# Patient Record
Sex: MALE
[De-identification: names, ages, dates, MRNs, and addresses within clinical notes are randomized per-mention and may not be internally consistent; named-entity substitution may affect disease eponyms.]

---

## 2018-05-27 ENCOUNTER — CHARTING TRANS (OUTPATIENT)
Dept: OTHER | Age: 23
End: 2018-05-27

## 2018-05-27 ASSESSMENT — PAIN SCALES - GENERAL: PAINLEVEL_OUTOF10: 0

## 2018-11-01 VITALS — HEIGHT: 73 IN | WEIGHT: 160.17 LBS | BODY MASS INDEX: 21.23 KG/M2 | HEART RATE: 84 BPM | RESPIRATION RATE: 16 BRPM

## 2021-02-16 PROBLEM — F41.9 ANXIETY: Status: ACTIVE | Noted: 2021-02-16

## 2021-02-16 PROBLEM — N52.9 ERECTILE DYSFUNCTION, UNSPECIFIED ERECTILE DYSFUNCTION TYPE: Status: ACTIVE | Noted: 2021-02-16

## 2021-02-16 PROBLEM — E56.9 VITAMIN DEFICIENCY: Status: ACTIVE | Noted: 2021-02-16

## 2021-12-06 PROBLEM — R61 HYPERHIDROSIS: Status: ACTIVE | Noted: 2021-12-06

## 2021-12-07 PROBLEM — E55.9 VITAMIN D DEFICIENCY: Status: ACTIVE | Noted: 2021-12-07

## 2023-10-13 ENCOUNTER — HOSPITAL ENCOUNTER (EMERGENCY)
Facility: HOSPITAL | Age: 28
Discharge: HOME | End: 2023-10-14
Attending: EMERGENCY MEDICINE

## 2023-10-13 ENCOUNTER — APPOINTMENT (OUTPATIENT)
Dept: GENERAL RADIOLOGY | Facility: HOSPITAL | Age: 28
End: 2023-10-13
Attending: EMERGENCY MEDICINE
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
## 2023-10-13 DIAGNOSIS — S61.412A LACERATION OF LEFT HAND WITHOUT FOREIGN BODY, INITIAL ENCOUNTER: ICD-10-CM

## 2023-10-13 DIAGNOSIS — S52.202A CLOSED FRACTURE OF SHAFT OF LEFT ULNA, UNSPECIFIED FRACTURE MORPHOLOGY, INITIAL ENCOUNTER: ICD-10-CM

## 2023-10-13 PROCEDURE — 29105 APPLICATION LONG ARM SPLINT: CPT

## 2023-10-13 PROCEDURE — 73090 X-RAY EXAM OF FOREARM: CPT | Performed by: EMERGENCY MEDICINE

## 2023-10-13 PROCEDURE — 12001 RPR S/N/AX/GEN/TRNK 2.5CM/<: CPT

## 2023-10-13 PROCEDURE — 99284 EMERGENCY DEPT VISIT MOD MDM: CPT

## 2023-10-13 PROCEDURE — 90471 IMMUNIZATION ADMIN: CPT

## 2023-10-13 RX ORDER — HYDROCODONE BITARTRATE AND ACETAMINOPHEN 5; 325 MG/1; MG/1
1 TABLET ORAL ONCE
Status: COMPLETED | OUTPATIENT
Start: 2023-10-13 | End: 2023-10-13

## 2023-10-14 VITALS
OXYGEN SATURATION: 97 % | RESPIRATION RATE: 16 BRPM | DIASTOLIC BLOOD PRESSURE: 74 MMHG | WEIGHT: 168 LBS | TEMPERATURE: 97 F | SYSTOLIC BLOOD PRESSURE: 112 MMHG | HEIGHT: 73 IN | HEART RATE: 84 BPM | BODY MASS INDEX: 22.26 KG/M2

## 2023-10-14 RX ORDER — LIDOCAINE HYDROCHLORIDE 10 MG/ML
1 INJECTION, SOLUTION INFILTRATION; PERINEURAL ONCE
Status: COMPLETED | OUTPATIENT
Start: 2023-10-14 | End: 2023-10-14

## 2023-10-14 RX ORDER — HYDROCODONE BITARTRATE AND ACETAMINOPHEN 5; 325 MG/1; MG/1
1-2 TABLET ORAL EVERY 4 HOURS PRN
Qty: 12 TABLET | Refills: 0 | Status: SHIPPED | OUTPATIENT
Start: 2023-10-14 | End: 2023-11-01

## 2023-10-14 RX ORDER — LIDOCAINE HYDROCHLORIDE 10 MG/ML
INJECTION, SOLUTION INFILTRATION; PERINEURAL
Status: COMPLETED
Start: 2023-10-14 | End: 2023-10-14

## 2023-10-18 ENCOUNTER — TELEPHONE (OUTPATIENT)
Dept: ORTHOPEDICS CLINIC | Facility: CLINIC | Age: 28
End: 2023-10-18

## 2023-10-18 NOTE — TELEPHONE ENCOUNTER
Imaging was completed for this patient for a LT FOREARM FX, but it needs to be reviewed to see if additional imaging is needed. If so, please enter the appropriate RX and let the patient know to come in before their appointment to complete the additional imaging. Thank you!     Future Appointments   Date Time Provider Carole Lewis   10/19/2023  8:20 AM Mariely Duran MD [facility] [id number]

## 2023-10-19 ENCOUNTER — ORDER TRANSCRIPTION (OUTPATIENT)
Dept: ORTHOPEDICS CLINIC | Facility: CLINIC | Age: 28
End: 2023-10-19

## 2023-10-19 ENCOUNTER — OFFICE VISIT (OUTPATIENT)
Dept: ORTHOPEDICS CLINIC | Facility: CLINIC | Age: 28
End: 2023-10-19

## 2023-10-19 VITALS — HEIGHT: 73 IN | BODY MASS INDEX: 22.26 KG/M2 | WEIGHT: 168 LBS

## 2023-10-19 DIAGNOSIS — S52.232A DISPLACED OBLIQUE FRACTURE OF SHAFT OF LEFT ULNA, INITIAL ENCOUNTER FOR CLOSED FRACTURE: Primary | ICD-10-CM

## 2023-10-23 ENCOUNTER — OFFICE VISIT (OUTPATIENT)
Dept: ORTHOPEDICS CLINIC | Facility: CLINIC | Age: 28
End: 2023-10-23

## 2023-10-23 ENCOUNTER — TELEPHONE (OUTPATIENT)
Dept: ORTHOPEDICS CLINIC | Facility: CLINIC | Age: 28
End: 2023-10-23

## 2023-10-23 ENCOUNTER — HOSPITAL ENCOUNTER (OUTPATIENT)
Dept: GENERAL RADIOLOGY | Age: 28
Discharge: HOME | End: 2023-10-23
Attending: ORTHOPAEDIC SURGERY

## 2023-10-23 VITALS — BODY MASS INDEX: 22.53 KG/M2 | WEIGHT: 170 LBS | HEIGHT: 73 IN

## 2023-10-23 DIAGNOSIS — S52.232A DISPLACED OBLIQUE FRACTURE OF SHAFT OF LEFT ULNA, INITIAL ENCOUNTER FOR CLOSED FRACTURE: ICD-10-CM

## 2023-10-23 DIAGNOSIS — S52.232A DISPLACED OBLIQUE FRACTURE OF SHAFT OF LEFT ULNA, INITIAL ENCOUNTER FOR CLOSED FRACTURE: Primary | ICD-10-CM

## 2023-10-23 PROCEDURE — 73090 X-RAY EXAM OF FOREARM: CPT | Performed by: ORTHOPAEDIC SURGERY

## 2023-10-23 NOTE — TELEPHONE ENCOUNTER
Future Appointments   Date Time Provider Carole Shorei   10/25/2023  3:00 PM Buzz Arias MD [facility]     Patient called and would like to be rescheduled in the afternoon for today. He will call me back to confirm if he can make it by 1:30 today. Thanks.     Patient may be reached at [phone]

## 2023-10-23 NOTE — TELEPHONE ENCOUNTER
Future Appointments   Date Time Provider Carole Debbie   10/23/2023  1:30 PM Ezekiel Coon MD [facility] [id number]       Patient called back and states that he will be able to come in today at 1:30 was able to change the time of his ride.

## 2023-10-24 ENCOUNTER — TELEPHONE (OUTPATIENT)
Dept: ORTHOPEDICS CLINIC | Facility: CLINIC | Age: 28
End: 2023-10-24

## 2023-10-24 DIAGNOSIS — S52.232A DISPLACED OBLIQUE FRACTURE OF SHAFT OF LEFT ULNA, INITIAL ENCOUNTER FOR CLOSED FRACTURE: Primary | ICD-10-CM

## 2023-10-24 RX ORDER — HYDROCODONE BITARTRATE AND ACETAMINOPHEN 5; 325 MG/1; MG/1
1 TABLET ORAL EVERY 6 HOURS PRN
Qty: 15 TABLET | Refills: 0 | Status: SHIPPED | OUTPATIENT
Start: 2023-10-24

## 2023-10-24 NOTE — TELEPHONE ENCOUNTER
Patient called stating he was supposed to get an RX sent to his pharmacy yesterday. No RX on file. Pt thinks it was hydrocodone, that they discussed at the appt. Please advise.     Pharmacy:   [facility], [address] [place] [address] Drive AT [facility] [address] [facility] , [phone], [phone]

## 2023-11-01 ENCOUNTER — OFFICE VISIT (OUTPATIENT)
Dept: ORTHOPEDICS CLINIC | Facility: CLINIC | Age: 28
End: 2023-11-01

## 2023-11-01 ENCOUNTER — TELEPHONE (OUTPATIENT)
Dept: ORTHOPEDICS CLINIC | Facility: CLINIC | Age: 28
End: 2023-11-01

## 2023-11-01 ENCOUNTER — HOSPITAL ENCOUNTER (OUTPATIENT)
Dept: GENERAL RADIOLOGY | Age: 28
Discharge: HOME | End: 2023-11-01
Attending: ORTHOPAEDIC SURGERY

## 2023-11-01 VITALS — HEIGHT: 73 IN | WEIGHT: 170 LBS | BODY MASS INDEX: 22.53 KG/M2

## 2023-11-01 DIAGNOSIS — S52.232A DISPLACED OBLIQUE FRACTURE OF SHAFT OF LEFT ULNA, INITIAL ENCOUNTER FOR CLOSED FRACTURE: Primary | ICD-10-CM

## 2023-11-01 DIAGNOSIS — S52.232A DISPLACED OBLIQUE FRACTURE OF SHAFT OF LEFT ULNA, INITIAL ENCOUNTER FOR CLOSED FRACTURE: ICD-10-CM

## 2023-11-01 PROCEDURE — 73090 X-RAY EXAM OF FOREARM: CPT | Performed by: ORTHOPAEDIC SURGERY

## 2023-11-01 RX ORDER — HYDROCODONE BITARTRATE AND ACETAMINOPHEN 5; 325 MG/1; MG/1
1 TABLET ORAL EVERY 6 HOURS PRN
Qty: 30 TABLET | Refills: 0 | Status: SHIPPED | OUTPATIENT
Start: 2023-11-01

## 2023-11-01 NOTE — TELEPHONE ENCOUNTER
Date of Surgery: 11/3/23       Post Op Appt: 23 @ 1020    Case ID: [id number]    Notes:         [address]   Name: Jluis Rao  MRN: [id number]   : 1995     Surgical Date:    11/3/23   Surgical Consent:    Open reduction internal fixation of left distal ulna fracture   Diagnosis:     (S52.232A) Displaced oblique fracture of shaft of left ulna, initial encounter for closed fracture  (primary encounter diagnosis)    Procedure Codes:    ORIF ulna shaft fracture (CPT 50355)   Disposition:    Outpatient   Operative Time:    1 hr   Antibiotics:    Ancef 2g   Anesthesia Type:    Regional   Clearance:     NONE   Equipment:    DRFx (SD): [employer] Forearm Plating System and Ulnar shortening osteotomy plating system, Small Power, Full C-arm, Suture 0-Vicryl UR6 needle, 3-0 monocryl, 4-0 monocryl, Exofin, Steri-strips, 5x30 plaster sheets, 2 inch ace wrap, sling   Assistant:    Stephanie Wong Assistant: Yes, Surgical Assist    Follow Up:    7-10 days post op with Grant Carl   Pain Medication:    Resent   Therapy:    None

## 2023-11-01 NOTE — PROGRESS NOTES
SURGICAL BOOKING SHEET   Name: Dianne Schilder  MRN: [id number]   : 1995    Surgical Date:   11/3/23   Surgical Consent:   Open reduction internal fixation of left distal ulna fracture   Diagnosis:    (S52.232A) Displaced oblique fracture of shaft of left ulna, initial encounter for closed fracture  (primary encounter diagnosis)    Procedure Codes:   ORIF ulna shaft fracture (CPT 40594)   Disposition:   Outpatient   Operative Time:   1 hr   Antibiotics:   Ancef 2g   Anesthesia Type:   Regional   Clearance:    NONE   Equipment:   DRFx (SD): [employer] Forearm Plating System and Ulnar shortening osteotomy plating system, Small Power, Full C-arm, Suture 0-Vicryl UR6 needle, 3-0 monocryl, 4-0 monocryl, Exofin, Steri-strips, 5x30 plaster sheets, 2 inch ace wrap, sling   Assistant:   Kathy Hampton Assistant: Yes, Surgical Assist    Follow Up:   7-10 days post op with Anshul Khan   Pain Medication:   Resent   Therapy:   None

## 2023-11-01 NOTE — TELEPHONE ENCOUNTER
Called and spoke with Halley Cross in regards to his upcoming surgery. I did go over the surgical protocol and post op appt date and time with him. He states understanding with no questions or concerns.

## 2023-11-02 ENCOUNTER — ANESTHESIA EVENT (OUTPATIENT)
Dept: SURGERY | Facility: HOSPITAL | Age: 28
End: 2023-11-02

## 2023-11-02 NOTE — TELEPHONE ENCOUNTER
PRE ADMISSION CALLED AND STATED THEY HAVEN'T BEEN ABLE TO GET IN TOUCH WITH PATIENT. THEY WILL NEED A PRE PAYMENT OF [phone] FOR UPCOMING SURGERY. I CALLED PATIENT AND LEFT A VM AND ASKED TO CALL PRE ADMISSION AND GAVE THEM THEIR PHONE NUMBER.

## 2023-11-03 ENCOUNTER — APPOINTMENT (OUTPATIENT)
Dept: GENERAL RADIOLOGY | Facility: HOSPITAL | Age: 28
End: 2023-11-03
Attending: ORTHOPAEDIC SURGERY

## 2023-11-03 ENCOUNTER — HOSPITAL ENCOUNTER (OUTPATIENT)
Facility: HOSPITAL | Age: 28
Setting detail: HOSPITAL OUTPATIENT SURGERY
Discharge: HOME | End: 2023-11-03
Attending: ORTHOPAEDIC SURGERY | Admitting: ORTHOPAEDIC SURGERY

## 2023-11-03 ENCOUNTER — ANESTHESIA (OUTPATIENT)
Dept: SURGERY | Facility: HOSPITAL | Age: 28
End: 2023-11-03

## 2023-11-03 VITALS
SYSTOLIC BLOOD PRESSURE: 122 MMHG | HEART RATE: 86 BPM | TEMPERATURE: 98 F | BODY MASS INDEX: 22.93 KG/M2 | DIASTOLIC BLOOD PRESSURE: 78 MMHG | WEIGHT: 173 LBS | HEIGHT: 73 IN | OXYGEN SATURATION: 100 % | RESPIRATION RATE: 18 BRPM

## 2023-11-03 PROCEDURE — 76000 FLUOROSCOPY <1 HR PHYS/QHP: CPT | Performed by: ORTHOPAEDIC SURGERY

## 2023-11-03 PROCEDURE — 0PSL06Z REPOSITION LEFT ULNA WITH INTRAMEDULLARY INTERNAL FIXATION DEVICE, OPEN APPROACH: ICD-10-PCS | Performed by: ORTHOPAEDIC SURGERY

## 2023-11-03 DEVICE — IMPLANTABLE DEVICE: Type: IMPLANTABLE DEVICE | Site: ARM | Status: FUNCTIONAL

## 2023-11-03 DEVICE — IMPLANTABLE DEVICE: Type: IMPLANTABLE DEVICE | Site: ARM

## 2023-11-03 RX ORDER — LIDOCAINE HCL/EPINEPHRINE/PF 2%-1:200K
VIAL (ML) INJECTION AS NEEDED
Status: DISCONTINUED | OUTPATIENT
Start: 2023-11-03 | End: 2023-11-03

## 2023-11-03 RX ORDER — DIPHENHYDRAMINE HYDROCHLORIDE 50 MG/ML
12.5 INJECTION INTRAMUSCULAR; INTRAVENOUS AS NEEDED
Status: DISCONTINUED | OUTPATIENT
Start: 2023-11-03 | End: 2023-11-03

## 2023-11-03 RX ORDER — HYDROCODONE BITARTRATE AND ACETAMINOPHEN 5; 325 MG/1; MG/1
1 TABLET ORAL ONCE AS NEEDED
Status: DISCONTINUED | OUTPATIENT
Start: 2023-11-03 | End: 2023-11-03

## 2023-11-03 RX ORDER — BUPRENORPHINE HYDROCHLORIDE 0.32 MG/ML
INJECTION INTRAMUSCULAR; INTRAVENOUS AS NEEDED
Status: DISCONTINUED | OUTPATIENT
Start: 2023-11-03 | End: 2023-11-03 | Stop reason: SURG

## 2023-11-03 RX ORDER — HYDROMORPHONE HYDROCHLORIDE 1 MG/ML
0.4 INJECTION, SOLUTION INTRAMUSCULAR; INTRAVENOUS; SUBCUTANEOUS EVERY 5 MIN PRN
Status: DISCONTINUED | OUTPATIENT
Start: 2023-11-03 | End: 2023-11-03

## 2023-11-03 RX ORDER — DEXAMETHASONE SODIUM PHOSPHATE 4 MG/ML
VIAL (ML) INJECTION AS NEEDED
Status: DISCONTINUED | OUTPATIENT
Start: 2023-11-03 | End: 2023-11-03 | Stop reason: SURG

## 2023-11-03 RX ORDER — BUPIVACAINE HYDROCHLORIDE 5 MG/ML
INJECTION, SOLUTION EPIDURAL; INTRACAUDAL AS NEEDED
Status: DISCONTINUED | OUTPATIENT
Start: 2023-11-03 | End: 2023-11-03 | Stop reason: SURG

## 2023-11-03 RX ORDER — CEFAZOLIN SODIUM/WATER 2 G/20 ML
2 SYRINGE (ML) INTRAVENOUS ONCE
Status: COMPLETED | OUTPATIENT
Start: 2023-11-03 | End: 2023-11-03

## 2023-11-03 RX ORDER — NALOXONE HYDROCHLORIDE 0.4 MG/ML
80 INJECTION, SOLUTION INTRAMUSCULAR; INTRAVENOUS; SUBCUTANEOUS AS NEEDED
Status: DISCONTINUED | OUTPATIENT
Start: 2023-11-03 | End: 2023-11-03

## 2023-11-03 RX ORDER — SODIUM CHLORIDE, SODIUM LACTATE, POTASSIUM CHLORIDE, CALCIUM CHLORIDE 600; 310; 30; 20 MG/100ML; MG/100ML; MG/100ML; MG/100ML
INJECTION, SOLUTION INTRAVENOUS CONTINUOUS
Status: DISCONTINUED | OUTPATIENT
Start: 2023-11-03 | End: 2023-11-03

## 2023-11-03 RX ORDER — HYDROMORPHONE HYDROCHLORIDE 1 MG/ML
0.6 INJECTION, SOLUTION INTRAMUSCULAR; INTRAVENOUS; SUBCUTANEOUS EVERY 5 MIN PRN
Status: DISCONTINUED | OUTPATIENT
Start: 2023-11-03 | End: 2023-11-03

## 2023-11-03 RX ORDER — MIDAZOLAM HYDROCHLORIDE 1 MG/ML
INJECTION INTRAMUSCULAR; INTRAVENOUS AS NEEDED
Status: DISCONTINUED | OUTPATIENT
Start: 2023-11-03 | End: 2023-11-03 | Stop reason: SURG

## 2023-11-03 RX ORDER — SCOLOPAMINE TRANSDERMAL SYSTEM 1 MG/1
1 PATCH, EXTENDED RELEASE TRANSDERMAL ONCE
Status: DISCONTINUED | OUTPATIENT
Start: 2023-11-03 | End: 2023-11-03 | Stop reason: HOSPADM

## 2023-11-03 RX ORDER — MIDAZOLAM HYDROCHLORIDE 1 MG/ML
1 INJECTION INTRAMUSCULAR; INTRAVENOUS EVERY 5 MIN PRN
Status: DISCONTINUED | OUTPATIENT
Start: 2023-11-03 | End: 2023-11-03

## 2023-11-03 RX ORDER — DEXAMETHASONE SODIUM PHOSPHATE 10 MG/ML
INJECTION, SOLUTION INTRAMUSCULAR; INTRAVENOUS AS NEEDED
Status: DISCONTINUED | OUTPATIENT
Start: 2023-11-03 | End: 2023-11-03 | Stop reason: SURG

## 2023-11-03 RX ORDER — ONDANSETRON 2 MG/ML
4 INJECTION INTRAMUSCULAR; INTRAVENOUS EVERY 6 HOURS PRN
Status: DISCONTINUED | OUTPATIENT
Start: 2023-11-03 | End: 2023-11-03

## 2023-11-03 RX ORDER — HYDROMORPHONE HYDROCHLORIDE 1 MG/ML
0.2 INJECTION, SOLUTION INTRAMUSCULAR; INTRAVENOUS; SUBCUTANEOUS EVERY 5 MIN PRN
Status: DISCONTINUED | OUTPATIENT
Start: 2023-11-03 | End: 2023-11-03

## 2023-11-03 RX ORDER — ACETAMINOPHEN 500 MG
1000 TABLET ORAL ONCE
Status: DISCONTINUED | OUTPATIENT
Start: 2023-11-03 | End: 2023-11-03 | Stop reason: HOSPADM

## 2023-11-03 RX ORDER — HYDROCODONE BITARTRATE AND ACETAMINOPHEN 5; 325 MG/1; MG/1
2 TABLET ORAL ONCE AS NEEDED
Status: DISCONTINUED | OUTPATIENT
Start: 2023-11-03 | End: 2023-11-03

## 2023-11-03 RX ORDER — KETOROLAC TROMETHAMINE 30 MG/ML
INJECTION, SOLUTION INTRAMUSCULAR; INTRAVENOUS AS NEEDED
Status: DISCONTINUED | OUTPATIENT
Start: 2023-11-03 | End: 2023-11-03 | Stop reason: SURG

## 2023-11-03 RX ORDER — ACETAMINOPHEN 500 MG
1000 TABLET ORAL ONCE AS NEEDED
Status: DISCONTINUED | OUTPATIENT
Start: 2023-11-03 | End: 2023-11-03

## 2023-11-03 RX ORDER — LIDOCAINE HYDROCHLORIDE 10 MG/ML
INJECTION, SOLUTION EPIDURAL; INFILTRATION; INTRACAUDAL; PERINEURAL AS NEEDED
Status: DISCONTINUED | OUTPATIENT
Start: 2023-11-03 | End: 2023-11-03 | Stop reason: SURG

## 2023-11-03 RX ORDER — LIDOCAINE HYDROCHLORIDE 20 MG/ML
INJECTION, SOLUTION EPIDURAL; INFILTRATION; INTRACAUDAL; PERINEURAL AS NEEDED
Status: DISCONTINUED | OUTPATIENT
Start: 2023-11-03 | End: 2023-11-03 | Stop reason: SURG

## 2023-11-03 RX ORDER — CEFAZOLIN SODIUM/WATER 2 G/20 ML
2 SYRINGE (ML) INTRAVENOUS ONCE
Status: DISCONTINUED | OUTPATIENT
Start: 2023-11-03 | End: 2023-11-03 | Stop reason: HOSPADM

## 2023-11-03 RX ORDER — IBUPROFEN 200 MG
200 TABLET ORAL EVERY 6 HOURS PRN
COMMUNITY

## 2023-11-03 RX ORDER — ONDANSETRON 2 MG/ML
INJECTION INTRAMUSCULAR; INTRAVENOUS AS NEEDED
Status: DISCONTINUED | OUTPATIENT
Start: 2023-11-03 | End: 2023-11-03 | Stop reason: SURG

## 2023-11-03 RX ORDER — PROCHLORPERAZINE EDISYLATE 5 MG/ML
5 INJECTION INTRAMUSCULAR; INTRAVENOUS EVERY 8 HOURS PRN
Status: DISCONTINUED | OUTPATIENT
Start: 2023-11-03 | End: 2023-11-03

## 2023-11-03 RX ADMIN — LIDOCAINE HYDROCHLORIDE 25 MG: 10 INJECTION, SOLUTION EPIDURAL; INFILTRATION; INTRACAUDAL; PERINEURAL at 17:06:00

## 2023-11-03 RX ADMIN — SODIUM CHLORIDE, SODIUM LACTATE, POTASSIUM CHLORIDE, CALCIUM CHLORIDE: 600; 310; 30; 20 INJECTION, SOLUTION INTRAVENOUS at 18:53:00

## 2023-11-03 RX ADMIN — MIDAZOLAM HYDROCHLORIDE 2 MG: 1 INJECTION INTRAMUSCULAR; INTRAVENOUS at 17:05:00

## 2023-11-03 RX ADMIN — MIDAZOLAM HYDROCHLORIDE 2 MG: 1 INJECTION INTRAMUSCULAR; INTRAVENOUS at 17:06:00

## 2023-11-03 RX ADMIN — BUPIVACAINE HYDROCHLORIDE 15 ML: 5 INJECTION, SOLUTION EPIDURAL; INTRACAUDAL at 17:16:00

## 2023-11-03 RX ADMIN — DEXAMETHASONE SODIUM PHOSPHATE 2 MG: 10 INJECTION, SOLUTION INTRAMUSCULAR; INTRAVENOUS at 17:16:00

## 2023-11-03 RX ADMIN — ONDANSETRON 4 MG: 2 INJECTION INTRAMUSCULAR; INTRAVENOUS at 17:45:00

## 2023-11-03 RX ADMIN — CEFAZOLIN SODIUM/WATER 2 G: 2 G/20 ML SYRINGE (ML) INTRAVENOUS at 17:19:00

## 2023-11-03 RX ADMIN — KETOROLAC TROMETHAMINE 30 MG: 30 INJECTION, SOLUTION INTRAMUSCULAR; INTRAVENOUS at 17:45:00

## 2023-11-03 RX ADMIN — BUPRENORPHINE HYDROCHLORIDE 150 MCG: 0.32 INJECTION INTRAMUSCULAR; INTRAVENOUS at 17:16:00

## 2023-11-03 RX ADMIN — DEXAMETHASONE SODIUM PHOSPHATE 8 MG: 4 MG/ML VIAL (ML) INJECTION at 17:45:00

## 2023-11-03 RX ADMIN — LIDOCAINE HYDROCHLORIDE 15 ML: 20 INJECTION, SOLUTION EPIDURAL; INFILTRATION; INTRACAUDAL; PERINEURAL at 17:16:00

## 2023-11-03 NOTE — OPERATIVE REPORT
Operative Note    Patient Name: Celia Donaldson    11/3/2023    Preoperative Diagnosis:     Displaced oblique fracture of shaft of left ulna, initial encounter for closed fracture [S52.232A]    Postoperative Diagnosis:     Displaced oblique fracture of shaft of left ulna, initial encounter for closed fracture [S52.232A]    Surgeon(s) and Role:     Carmen Carrington MD - Primary     Assistant: Surgical Assistant.: JP Funez SA was needed for the successful completion of this case. She was essential for the proper positioning of patient, manipulation of instruments, proper exposure, manipulation for fracture/holding reduction, and wound closure. Procedures:     Open reduction internal fixation of left ulna shaft fracture (CPT 16299)    Antibiotics: Ancef 2g    Surgical Findings: distal ulna shaft fracture with callus    Anesthesia: Regional    Complications: None    Implants:   Implant Name Type Inv. Item Serial No.  Lot No. LRB No. Used Action   PLATE BNE ULN SHORTNG DST L - SNA  PLATE BNE ULN SHORTNG DST L NA [employer] [facility] [id number] Left 1 Implanted   SCREW BNE L14MM DIA3.5MM EDIN DST RAD VOLAR - SNA  SCREW BNE L14MM DIA3.5MM EDIN DST RAD VOLAR NA [employer] [facility] [id number] Left 2 Implanted   PEG BNE FIX L14MM DIA2.7MM DST RAD TI THRD - SNA  PEG BNE FIX L14MM DIA2.7MM DST RAD TI THRD NA [employer] [facility] [id number] Left 1 Implanted   PEG BNE FIX L12MM DIA2. 3MM DST RAD TI THRD - SNA  PEG BNE FIX L12MM DIA2. 3MM DST RAD TI THRD NA [employer] [facility] [id number] Left 2 Implanted   PEG BNE FIX L10MM DIA2. 3MM DST RAD TI THRD - SNA  PEG BNE FIX L10MM DIA2. 3MM DST RAD TI THRD NA [employer] [facility] [id number] Left 1 Implanted   SCREW BNE L16MM DIA3.5MM EDIN DST RAD TI TIMMY - SNA  SCREW BNE L16MM DIA3.5MM EDIN DST RAD TI TIMMY NA [employer] [facility] [id number] Left 1 Implanted       Condition: Stable    Estimated Blood Loss: 15 mL     Indications:  33year old male who sustained a distal radius fracture with significant displacement. Surgical and non-surgical treatment options and their respective outcomes were reviewed with patient. The risks of surgery include but not limited to: infection, nerve injury, vascular injury, tendon rupture, malunion, delayed union, non-union, failure of surgery, displacement, implant failure, refracture, stiffness, chronic regional pain syndrome and carpal tunnel syndrome. Using a shared decision making process, the patient elected to proceed with surgery. The patient consented to surgery after having understood the risks associated with the procedure, expected outcome, the expected time to recovery, the rehab required, and all questions answered. Procedure:  Patient was met in the preoperative holding area where consent was verified, laterality was marked with the surgeon's initials, and the H&P was updated. Patient was brought to the operating room on a transport cart. Patient was transferred from the transport cart onto the operating room table and placed in a supine position. An upper arm tourniquet was placed. The arm was prepped and draped in the usual sterile fashion. A surgical timeout was performed. Antibiotics were fully infused. The limb was elevated and exsanguinated using Esmarch bandage. Tourniquet was raised to 250 mmHg. A 15 blade was used to make incision through the dermis along the ECU/FCU interval. The incision was carried through the dermis. Steveon and cyn's scissors were used to dissect through subcutaneous tissue down to fascia. ECU/FCU interval was identified and incised. The ECU and FCU muscle was elevated off the ulna. 15 blade was to elevate the periosteum. Fracture was identified and debrided of callus to allow mobilization of fracture ends. Anatomical reduction was noted on fluroscopy. A plate was placed and held in place with a clamp.   2 of the proximal oblong screw holes were drilled and nonlocking screws of the appropriate length were placed. A K wire was placed in to the distal screw hole through a aiming guide. K wire trajectory was confirmed. Subsequent distal screw holes were drilled and appropriate length locking screws were inserted. The guidewire and aiming guide was removed and appropriate length locking screw was placed. The clamp was removed. Clamp was used to compress the fracture site after loosening the 2 screws in the oblong hole. Once compression was created at the fracture site the screws were seated down. The proximal most hole was drilled and a locking screw was placed. Fluoroscopy was once again used to confirm appropriate screw trajectory and length. The wrist was taken through range of motion: Extension, flexion, radial deviation, ulnar deviation, supination, and pronation. DRUJ was stable on examination. The fixation construct was noted to be stable. The wound was irrigated. The tourniquet was let down and adequate hemostasis was achieved with bipolar cautery. Closure:   0 Vicryl was used to approximate ECU and FCU interval. The wound was once again irrigated prior to superficial closure of the skin. 3-0 Monocryl was used to reapproximate the skin in a simple buried interrupted fashion. A 4-0 Monocryl was then used to run a subcuticular stitch. Dermabond and Steri-Strips were applied after the wound closure. Dressing/Splint:  Bacitracin, Adaptic, and gauze were applied to the carpal tunnel incision. Adaptic and 4 x 4 gauze were applied to the distal radius incision and held in place with a loosely wrapped Kerlix. A volar plaster splint was applied and held in place with a loosely wrapped Ace wrap. Patient's arm was placed in a sling. Post Operative:  Patient was woken up in stable condition, taken to PACU for further recovery, and eventually discharged home. No complications were encountered.     Pedro Navarro MD  Hand, Wrist, & Elbow Surgery  [facility]  [facility] [address] [facility]. [facility]  [email or web address]  t: [phone]  f: [phone]

## 2023-11-03 NOTE — ANESTHESIA PROCEDURE NOTES
Regional Block    Date/Time: 11/3/2023 5:05 PM    Performed by: Kevin Beal MD  Authorized by: Kevin Beal MD      General Information and Staff    Start Time:  11/3/2023 5:05 PM  End Time:  11/3/2023 5:16 PM  Anesthesiologist:  Kevin Beal MD  Performed by: Anesthesiologist  Patient Location:  OR      Site Identification: real time ultrasound guided, nerve stimulator, surface landmarks and image stored and retrievable    Block site/laterality marked before start: site marked  Reason for Block: surgical anesthesia    Preanesthetic Checklist: 2 patient identifers, IV checked, site marked, risks and benefits discussed, monitors and equipment checked, pre-op evaluation, timeout performed, anesthesia consent, sterile technique used, no prohibitive neurological deficits and no local skin infection at insertion site      Procedure Details    Patient Position:  Supine  Prep: ChloraPrep    Monitoring:  Heart rate, cardiac monitor, continuous pulse ox and blood pressure cuff  Block Type:  Infraclavicular  Laterality:  Left  Injection Technique:  Single-shot    Needle    Needle Type:  Short-bevel  Needle Gauge:  21 G  Needle Length:  100 mm  Needle Localization:  Anatomical landmarks, nerve stimulator and ultrasound guidance  Reason for Ultrasound Use: appropriate spread of the medication was noted in real time and no ultrasound evidence of intravascular and/or intraneural injection    Nerve Stimulator: 0.5 amps  Muscle Twitch Response: flexor carpi radialis response      Assessment    Injection Assessment:  Good spread noted, incremental injection, local visualized surrounding nerve on ultrasound, low pressure, negative aspiration for heme, negative resistance and no pain on injection  Heart Rate Change: No    - Patient tolerated block procedure well without evidence of immediate block related complications. Medications  11/3/2023 5:05 PM      Additional Comments    Local lidocaine 1% 3ccs to skin.    Uneventful block placement.     Local per MAR, lidocaine/bupivacaine mix, with adjuncts PF dexmethasone 2mg and buprenorphine 150mcgm

## 2023-11-03 NOTE — BRIEF OP NOTE
Pre-Operative Diagnosis: Displaced oblique fracture of shaft of left ulna, initial encounter for closed fracture [S52.232A]     Post-Operative Diagnosis: Displaced oblique fracture of shaft of left ulna, initial encounter for closed fracture [S52.232A]      Procedure Performed:   OPEN REDUCTION INTERNAL FIXATION OF LEFT DISTAL ULNA FRACTURE    Surgeon(s) and Role:     Hattie Smith MD - Primary    Assistant(s):  Surgical Assistant.: Ruiz Prather CSA     Surgical Findings: ulnar shaft fracture     Specimen: None     Estimated Blood Loss: Blood Output: 15 mL (11/3/2023  6:24 PM)      Dictation Number:  none    Shanice Remy MD  11/3/2023  6:34 PM

## 2023-11-04 NOTE — DISCHARGE INSTRUCTIONS
Dressing: Keep splint on follow up visit. Keep splint clean/dry/intact. Okay to shower with splint covered/protected from getting wet. Weight Bearing: No lifting greater than 1lbs with hand. Activity: You can discontinue sling once block wears off. Work on opening and closing your fingers to prevent stiffness. Work on palm up and palm down motion. Ice and elevate to help minimize swelling. Pain: Take acetaminophen and ibuprofen for pain. Take Norco 325-5mg for breakthrough pain. Call for follow up appointment if you don't have one.        You received a drug called Toradol which is an Anti Inflammatory at: 5:45pm  If you are allowed to take Anti inflammatories:    Do not take any Anti Inflammatory like Motrin, Aleve or Ibuprophen until after: 11:45pm  Please report any suspected allergic reactions or bleeding issues to your doctor

## 2023-11-14 ENCOUNTER — TELEPHONE (OUTPATIENT)
Dept: ORTHOPEDICS CLINIC | Facility: CLINIC | Age: 28
End: 2023-11-14

## 2023-11-16 NOTE — TELEPHONE ENCOUNTER
Spoke with pt and got him on the schedule for 11/29. I tried to offer the one SDA slot on 11/22 but pt has court that day and will be unable to make it, FAINA.      Future Appointments   Date Time Provider Carole Lewis   11/29/2023  8:30 AM Luis Munoz MD [facility]

## 2023-11-29 ENCOUNTER — HOSPITAL ENCOUNTER (OUTPATIENT)
Dept: GENERAL RADIOLOGY | Age: 28
Discharge: HOME | End: 2023-11-29
Attending: PHYSICIAN ASSISTANT
Payer: MEDICAID

## 2023-11-29 ENCOUNTER — OFFICE VISIT (OUTPATIENT)
Dept: ORTHOPEDICS CLINIC | Facility: CLINIC | Age: 28
End: 2023-11-29
Payer: MEDICAID

## 2023-11-29 VITALS — HEIGHT: 73 IN | BODY MASS INDEX: 22.93 KG/M2 | WEIGHT: 173 LBS

## 2023-11-29 DIAGNOSIS — S52.232A DISPLACED OBLIQUE FRACTURE OF SHAFT OF LEFT ULNA, INITIAL ENCOUNTER FOR CLOSED FRACTURE: Primary | ICD-10-CM

## 2023-11-29 DIAGNOSIS — S52.232A DISPLACED OBLIQUE FRACTURE OF SHAFT OF LEFT ULNA, INITIAL ENCOUNTER FOR CLOSED FRACTURE: ICD-10-CM

## 2023-11-29 PROCEDURE — 3008F BODY MASS INDEX DOCD: CPT | Performed by: ORTHOPAEDIC SURGERY

## 2023-11-29 PROCEDURE — 73090 X-RAY EXAM OF FOREARM: CPT | Performed by: PHYSICIAN ASSISTANT

## 2023-11-29 PROCEDURE — 99024 POSTOP FOLLOW-UP VISIT: CPT | Performed by: ORTHOPAEDIC SURGERY

## (undated) DEVICE — SUTURE MCRYL SZ 4-0 L27IN ABSRB UD L19MM PS-2

## (undated) DEVICE — DISPOSABLE BIPOLAR FORCEPS 4" (10.2CM) JEWELERS, STRAIGHT 0.4MM TIP AND 12 FT. (3.6M) CABLE: Brand: KIRWAN

## (undated) DEVICE — SOLUTION RUBBING 4OZ 70% ISO ALC CLR

## (undated) DEVICE — UPPER EXTREMITY CDS-LF: Brand: MEDLINE INDUSTRIES, INC.

## (undated) DEVICE — STERILE POLYISOPRENE POWDER-FREE SURGICAL GLOVES: Brand: PROTEXIS

## (undated) DEVICE — STERILE HOOK LOCK LATEX FREE ELASTIC BANDAGE 2INX5YD: Brand: HOOK LOCK™

## (undated) DEVICE — SUTURE MCRYL SZ 3-0 L27IN ABSRB UD L19MM PS-2

## (undated) DEVICE — SPONGE GZ 4XL4IN 100% COT 12 PLY TYP VII WVN

## (undated) DEVICE — MINI-BLADE®: Brand: BEAVER®

## (undated) DEVICE — C-ARM: Brand: UNBRANDED

## (undated) DEVICE — SLEEVE COMPR M KNEE LEN SGL USE KENDALL SCD

## (undated) DEVICE — TOWEL SURG SM W12XL18IN CLR PLAS TEAR RESIST

## (undated) DEVICE — GOWN,SIRUS,FABRIC-REINFORCED,X-LARGE: Brand: MEDLINE

## (undated) DEVICE — DISPOSABLE TOURNIQUET CUFF SINGLE BLADDER, DUAL PORT AND QUICK CONNECT CONNECTOR: Brand: COLOR CUFF

## (undated) DEVICE — 3M™ STERI-STRIP™ REINFORCED ADHESIVE SKIN CLOSURES, R1547, 1/2 IN X 4 IN (12 MM X 100 MM), 6 STRIPS/ENVELOPE: Brand: 3M™ STERI-STRIP™

## (undated) DEVICE — Device

## (undated) DEVICE — ADHESIVE SKIN TOP FOR WND CLSR DERMBND ADV

## (undated) DEVICE — GUIDE SUR DIA1.5MM AIMING FOR GEMINUS VOLAR

## (undated) DEVICE — #15 STERILE STAINLESS BLADE: Brand: STERILE STAINLESS BLADES

## (undated) DEVICE — STERILE POLYISOPRENE POWDER-FREE SURGICAL GLOVES WITH EMOLLIENT COATING: Brand: PROTEXIS

## (undated) DEVICE — SPLINT ORTH W4XL30IN PLSTR OF PARIS LO

## (undated) DEVICE — SOLUTION IRRIG 1000ML 0.9% NACL USP BTL

## (undated) DEVICE — SUTURE VCRL SZ 0 L27IN ABSRB VLT L26MM UR-6